# Patient Record
Sex: MALE | Race: WHITE | NOT HISPANIC OR LATINO | Employment: OTHER | URBAN - METROPOLITAN AREA
[De-identification: names, ages, dates, MRNs, and addresses within clinical notes are randomized per-mention and may not be internally consistent; named-entity substitution may affect disease eponyms.]

---

## 2018-04-10 RX ORDER — FLUTICASONE PROPIONATE 50 MCG
2 SPRAY, SUSPENSION (ML) NASAL DAILY
COMMUNITY
End: 2022-07-21

## 2018-04-10 RX ORDER — SILDENAFIL CITRATE 20 MG/1
20 TABLET ORAL 3 TIMES DAILY
COMMUNITY
End: 2022-07-21 | Stop reason: SDUPTHER

## 2018-04-10 RX ORDER — ALLOPURINOL 100 MG/1
100 TABLET ORAL DAILY
COMMUNITY
End: 2022-07-01 | Stop reason: SDUPTHER

## 2018-04-10 RX ORDER — BUDESONIDE AND FORMOTEROL FUMARATE DIHYDRATE 80; 4.5 UG/1; UG/1
2 AEROSOL RESPIRATORY (INHALATION)
COMMUNITY
End: 2022-07-21 | Stop reason: SDUPTHER

## 2018-04-10 RX ORDER — PRAVASTATIN SODIUM 40 MG
40 TABLET ORAL DAILY
COMMUNITY
End: 2022-07-21 | Stop reason: SDUPTHER

## 2018-04-10 RX ORDER — METHOCARBAMOL 750 MG/1
750 TABLET, FILM COATED ORAL 4 TIMES DAILY PRN
COMMUNITY
End: 2018-04-11

## 2018-04-10 RX ORDER — LISINOPRIL 20 MG/1
20 TABLET ORAL DAILY
COMMUNITY
End: 2022-07-01 | Stop reason: SDUPTHER

## 2018-04-11 ENCOUNTER — OFFICE VISIT (OUTPATIENT)
Dept: NEUROSURGERY | Facility: CLINIC | Age: 68
End: 2018-04-11

## 2018-04-11 VITALS — HEIGHT: 71 IN | BODY MASS INDEX: 32.76 KG/M2 | WEIGHT: 234 LBS | TEMPERATURE: 96 F

## 2018-04-11 DIAGNOSIS — M54.9 MECHANICAL BACK PAIN: Primary | ICD-10-CM

## 2018-04-11 DIAGNOSIS — M50.30 DEGENERATIVE DISC DISEASE, CERVICAL: ICD-10-CM

## 2018-04-11 DIAGNOSIS — M47.812 CERVICAL ARTHRITIS: ICD-10-CM

## 2018-04-11 DIAGNOSIS — M50.30 BULGING OF CERVICAL INTERVERTEBRAL DISC: ICD-10-CM

## 2018-04-11 PROCEDURE — 99203 OFFICE O/P NEW LOW 30 MIN: CPT | Performed by: NEUROLOGICAL SURGERY

## 2018-04-11 NOTE — PROGRESS NOTES
Patient: Balbir Castro  : 1950    Primary Care Provider: Leif Beard MD    Requesting Provider: As above        History    Chief Complaint: Left-sided neck pain.    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  He's had what he describes as a left ear ache for many years.  More recently he's noted that it seems to be more present in the side of his neck on the left.  He describes a popping and cracking at times.  He has no radicular symptoms.  At times he gets some numbness in the ulnar fingers of each hand.  He's used an arthritis screen.  His symptoms are more aggravating than debilitating.  Activity seems to worsen his symptoms and he is very active currently.  He is remodeling a home.    Review of Systems   Constitutional: Negative for activity change, appetite change, chills, diaphoresis, fatigue, fever and unexpected weight change.   HENT: Positive for congestion and ear pain. Negative for dental problem, drooling, ear discharge, facial swelling, hearing loss, mouth sores, nosebleeds, postnasal drip, rhinorrhea, sinus pressure, sneezing, sore throat, tinnitus, trouble swallowing and voice change.    Eyes: Negative for photophobia, pain, discharge, redness, itching and visual disturbance.   Respiratory: Negative for apnea, cough, choking, chest tightness, shortness of breath, wheezing and stridor.    Cardiovascular: Negative for chest pain, palpitations and leg swelling.   Gastrointestinal: Negative for abdominal distention, abdominal pain, anal bleeding, blood in stool, constipation, diarrhea, nausea, rectal pain and vomiting.   Endocrine: Negative for cold intolerance, heat intolerance, polydipsia, polyphagia and polyuria.   Genitourinary: Negative for decreased urine volume, difficulty urinating, dysuria, enuresis, flank pain, frequency, genital sores, hematuria and urgency.   Musculoskeletal: Positive for neck pain and  "neck stiffness. Negative for arthralgias, back pain, gait problem, joint swelling and myalgias.   Skin: Negative for color change, pallor, rash and wound.   Allergic/Immunologic: Negative for environmental allergies, food allergies and immunocompromised state.   Neurological: Positive for numbness (Around Groin area, bilateral hands, Bilateral pinky fingers) and headaches. Negative for dizziness, tremors, seizures, syncope, facial asymmetry, speech difficulty, weakness and light-headedness.   Hematological: Negative for adenopathy. Does not bruise/bleed easily.   Psychiatric/Behavioral: Negative for agitation, behavioral problems, confusion, decreased concentration, dysphoric mood, hallucinations, self-injury, sleep disturbance and suicidal ideas. The patient is not nervous/anxious and is not hyperactive.    All other systems reviewed and are negative.      The patient's past medical history, past surgical history, family history, and social history have been reviewed at length in the electronic medical record.    Physical Exam:   Temp 96 °F (35.6 °C) (Temporal Artery )   Ht 180.3 cm (71\")   Wt 106 kg (234 lb)   BMI 32.64 kg/m²   CONSTITUTIONAL: Patient is well-nourished, pleasant and appears stated age.  CV: Heart regular rate and rhythm without murmur, rub, or gallop.  PULMONARY: Lungs are clear to ascultation.  MUSCULOSKELETAL:  Neck tenderness to palpation is not observed.   ROM in neck is normal.  NEUROLOGICAL:  Orientation, memory, attention span, language function, and cognition have been examined and are intact.  Strength is intact in the upper and lower extremities to direct testing.  Muscle tone is normal throughout.  Station and gait are normal.  Sensation is intact to light touch testing throughout.  Deep tendon reflexes are 1+ and symmetrical.  Jasmin's Sign is negative bilaterally. No clonus is elicited.  Coordination is intact.      Medical Decision Making    Data Review:   MRI of the cervical " spine dated 3/21/18 demonstrates some diffuse degenerative disc disease and mild spondylosis.  There is no cord or high-grade root compromise.    Diagnosis:   The patient has some mechanical neck pain that could well be facet mediated.  I do not see evidence of a radiculopathy or certainly a myelopathy.    Treatment Options:   Treatment should largely be symptomatic.  I am going to refer him to physical therapy for a few sessions to see if that will be helpful.  He does not feel as if his symptoms are severe enough to warrant facet blocks and potentially rhizotomies.  He will follow-up on an as-needed basis.       Diagnosis Plan   1. Mechanical back pain  Ambulatory Referral to Physical Therapy Evaluate and treat; ROM, Stretching   2. Bulging of cervical intervertebral disc     3. Degenerative disc disease, cervical     4. Cervical arthritis         Scribed for Luis Perea MD by Augustina Chu CMA on 04/11/2018 at 9:27 AM    I, Dr. Perea, personally performed the services described in the documentation, as scribed in my presence, and it is both accurate and complete.

## 2022-05-31 RX ORDER — PROPRANOLOL HYDROCHLORIDE 20 MG/1
TABLET ORAL
Qty: 90 TABLET | Refills: 0 | Status: SHIPPED | OUTPATIENT
Start: 2022-05-31 | End: 2022-07-21 | Stop reason: SDUPTHER

## 2022-06-28 RX ORDER — ALLOPURINOL 100 MG/1
TABLET ORAL
Qty: 90 TABLET | OUTPATIENT
Start: 2022-06-28

## 2022-06-28 RX ORDER — LISINOPRIL 20 MG/1
TABLET ORAL
Qty: 90 TABLET | OUTPATIENT
Start: 2022-06-28

## 2022-07-01 RX ORDER — LISINOPRIL 20 MG/1
20 TABLET ORAL DAILY
Qty: 30 TABLET | Refills: 0 | Status: SHIPPED | OUTPATIENT
Start: 2022-07-01 | End: 2022-07-21 | Stop reason: SDUPTHER

## 2022-07-01 RX ORDER — ALLOPURINOL 100 MG/1
100 TABLET ORAL DAILY
Qty: 30 TABLET | Refills: 0 | Status: SHIPPED | OUTPATIENT
Start: 2022-07-01 | End: 2022-07-21 | Stop reason: SDUPTHER

## 2022-07-21 ENCOUNTER — OFFICE VISIT (OUTPATIENT)
Dept: FAMILY MEDICINE CLINIC | Facility: CLINIC | Age: 72
End: 2022-07-21

## 2022-07-21 VITALS
SYSTOLIC BLOOD PRESSURE: 132 MMHG | DIASTOLIC BLOOD PRESSURE: 84 MMHG | HEART RATE: 85 BPM | BODY MASS INDEX: 31.48 KG/M2 | OXYGEN SATURATION: 98 % | HEIGHT: 71 IN | WEIGHT: 224.9 LBS

## 2022-07-21 DIAGNOSIS — Z12.5 PROSTATE CANCER SCREENING: ICD-10-CM

## 2022-07-21 DIAGNOSIS — J45.20 MILD INTERMITTENT ASTHMA WITHOUT COMPLICATION: ICD-10-CM

## 2022-07-21 DIAGNOSIS — E78.2 MIXED HYPERLIPIDEMIA: ICD-10-CM

## 2022-07-21 DIAGNOSIS — Z00.00 ROUTINE GENERAL MEDICAL EXAMINATION AT A HEALTH CARE FACILITY: Primary | ICD-10-CM

## 2022-07-21 DIAGNOSIS — I10 PRIMARY HYPERTENSION: ICD-10-CM

## 2022-07-21 PROCEDURE — 36415 COLL VENOUS BLD VENIPUNCTURE: CPT | Performed by: FAMILY MEDICINE

## 2022-07-21 PROCEDURE — 99397 PER PM REEVAL EST PAT 65+ YR: CPT | Performed by: FAMILY MEDICINE

## 2022-07-21 RX ORDER — LISINOPRIL 20 MG/1
20 TABLET ORAL DAILY
Qty: 30 TABLET | Refills: 0 | Status: SHIPPED | OUTPATIENT
Start: 2022-07-21 | End: 2022-07-25

## 2022-07-21 RX ORDER — SILDENAFIL CITRATE 20 MG/1
TABLET ORAL
Qty: 30 TABLET | Refills: 11 | Status: SHIPPED | OUTPATIENT
Start: 2022-07-21

## 2022-07-21 RX ORDER — PRAVASTATIN SODIUM 40 MG
40 TABLET ORAL DAILY
Qty: 90 TABLET | Refills: 1 | Status: SHIPPED | OUTPATIENT
Start: 2022-07-21 | End: 2023-02-17

## 2022-07-21 RX ORDER — ALLOPURINOL 100 MG/1
100 TABLET ORAL DAILY
Qty: 90 TABLET | Refills: 1 | Status: SHIPPED | OUTPATIENT
Start: 2022-07-21 | End: 2023-01-23

## 2022-07-21 RX ORDER — BUDESONIDE AND FORMOTEROL FUMARATE DIHYDRATE 80; 4.5 UG/1; UG/1
2 AEROSOL RESPIRATORY (INHALATION)
Qty: 10.2 G | Refills: 5 | Status: SHIPPED | OUTPATIENT
Start: 2022-07-21

## 2022-07-21 RX ORDER — PROPRANOLOL HYDROCHLORIDE 20 MG/1
20 TABLET ORAL DAILY
Qty: 90 TABLET | Refills: 1 | Status: SHIPPED | OUTPATIENT
Start: 2022-07-21 | End: 2023-03-06

## 2022-07-21 NOTE — PROGRESS NOTES
"Chief Complaint  Hypertension, Hyperlipidemia, and Annual Exam    Subjective          Balbir Castro presents to [facility] for preventative yearly exam.   Patient comes in today to recheck on his medications also he wants his blood work done says he been doing pretty good lately no other real problems or difficulties he states that he has having some stressors because his elderly parents and and mother-in-law has now been placed in assisted living.      Objective   Vital Signs:   /84   Pulse 85   Ht 180.3 cm (71\")   Wt 102 kg (224 lb 14.4 oz)   SpO2 98%   BMI 31.37 kg/m²     Body mass index is 31.37 kg/m².    Predictive Model Details   No score data available for Risk of Fall        PHQ-9 Depression Screening  Little interest or pleasure in doing things? 0-->not at all   Feeling down, depressed, or hopeless? 0-->not at all   Trouble falling or staying asleep, or sleeping too much?     Feeling tired or having little energy?     Poor appetite or overeating?     Feeling bad about yourself - or that you are a failure or have let yourself or your family down?     Trouble concentrating on things, such as reading the newspaper or watching television?     Moving or speaking so slowly that other people could have noticed? Or the opposite - being so fidgety or restless that you have been moving around a lot more than usual?     Thoughts that you would be better off dead, or of hurting yourself in some way?     PHQ-9 Total Score 0   If you checked off any problems, how difficult have these problems made it for you to do your work, take care of things at home, or get along with other people?       Immunization History   Administered Date(s) Administered   • COVID-19 (MODERNA) 1st, 2nd, 3rd Dose Only 03/02/2021, 03/30/2021   • COVID-19 (MODERNA) BOOSTER 10/26/2021   • Fluzone Split Quad (Multi-dose) 11/08/2019, 10/15/2021   • Influenza, Unspecified 10/15/2021   • Pneumococcal Conjugate " 13-Valent (PCV13) 07/25/2017   • Pneumococcal Polysaccharide (PPSV23) 08/02/2018       Review of Systems   Constitutional: Negative.    HENT: Negative for congestion, dental problem, ear discharge, ear pain and sore throat.    Respiratory: Negative for apnea, chest tightness and shortness of breath.    Gastrointestinal: Negative for constipation and nausea.   Endocrine: Negative for polyuria.   Genitourinary: Negative for difficulty urinating.   Musculoskeletal: Negative for arthralgias and gait problem.   Skin: Negative for rash.   Hematological: Negative for adenopathy.       Past History:  Medical History: has a past medical history of Actinic keratosis, Arthritis, Asthma, Benign prostatic hyperplasia, Bronchitis, Cervicalgia, Chronic gouty arthritis, ED (erectile dysfunction), Elevated blood pressure reading, Essential hypertension, Ganglion cyst, Gout, Headache, Hearing problem, High risk medication use, Hyperlipidemia, Hypertension, Idiopathic chronic gout of multiple sites without tophus, Inflamed seborrheic keratosis, Mild intermittent asthma, Mixed hyperlipidemia, Neck pain, Obesity, Osteoarthritis of knee, Osteoarthritis of knees, bilateral, Rheumatoid arthritis (HCC), Seasonal allergies, Spinal stenosis in cervical region, Tonsillitis, and Wheezing.   Surgical History: has a past surgical history that includes Cyst Removal (Left, 2008); Tympanostomy tube placement (Left, 2008); and Ganglion cyst excision.   Family History: family history includes Arthritis in his mother; Cancer in his maternal uncle; Hypertension in his father and mother.   Social History: reports that he quit smoking about 36 years ago. His smoking use included cigarettes. He has never used smokeless tobacco. He reports current alcohol use. He reports that he does not use drugs.      Current Outpatient Medications:   •  allopurinol (ZYLOPRIM) 100 MG tablet, Take 1 tablet by mouth Daily., Disp: 90 tablet, Rfl: 1  •  budesonide-formoterol  (SYMBICORT) 80-4.5 MCG/ACT inhaler, Inhale 2 puffs 2 (Two) Times a Day., Disp: 10.2 g, Rfl: 5  •  DICLOFENAC PO, Take  by mouth., Disp: , Rfl:   •  lisinopril (PRINIVIL,ZESTRIL) 20 MG tablet, Take 1 tablet by mouth Daily., Disp: 30 tablet, Rfl: 0  •  pravastatin (PRAVACHOL) 40 MG tablet, Take 1 tablet by mouth Daily., Disp: 90 tablet, Rfl: 1  •  propranolol (INDERAL) 20 MG tablet, Take 1 tablet by mouth Daily., Disp: 90 tablet, Rfl: 1  •  sildenafil (REVATIO) 20 MG tablet, 2 to 5 tablets p.o. 30 minutes before sexual activity, Disp: 30 tablet, Rfl: 11    Allergies: Penicillin g, Amoxicillin, and Penicillins    Physical Exam  Vitals reviewed.   Constitutional:       Appearance: Normal appearance.   HENT:      Head: Normocephalic.      Right Ear: Tympanic membrane, ear canal and external ear normal.      Left Ear: Tympanic membrane, ear canal and external ear normal.      Nose: Nose normal.      Mouth/Throat:      Pharynx: Oropharynx is clear.   Eyes:      Pupils: Pupils are equal, round, and reactive to light.   Cardiovascular:      Rate and Rhythm: Normal rate and regular rhythm.      Pulses: Normal pulses.   Pulmonary:      Effort: Pulmonary effort is normal.      Breath sounds: Normal breath sounds.   Abdominal:      General: Abdomen is flat. Bowel sounds are normal.      Palpations: Abdomen is soft.   Musculoskeletal:         General: Normal range of motion.   Skin:     General: Skin is warm and dry.   Neurological:      General: No focal deficit present.      Mental Status: He is alert and oriented to person, place, and time.          Result Review :                   Assessment and Plan    Diagnoses and all orders for this visit:    1. Routine general medical examination at a health care facility (Primary)  Comments:  Blood work and continue medications  Orders:  -     Comprehensive Metabolic Panel; Future  -     Comprehensive Metabolic Panel    2. Primary hypertension  Comments:  Blood work and refill  medications  Orders:  -     CBC & Differential; Future  -     CBC & Differential    3. Mixed hyperlipidemia  Comments:  Refill meds  Orders:  -     Lipid Panel; Future  -     Lipid Panel    4. Prostate cancer screening  Comments:  Get PSA  Orders:  -     PSA Screen; Future  -     PSA Screen    5. Mild intermittent asthma without complication  Comments:  Stable refilled meds    Other orders  -     allopurinol (ZYLOPRIM) 100 MG tablet; Take 1 tablet by mouth Daily.  Dispense: 90 tablet; Refill: 1  -     budesonide-formoterol (SYMBICORT) 80-4.5 MCG/ACT inhaler; Inhale 2 puffs 2 (Two) Times a Day.  Dispense: 10.2 g; Refill: 5  -     lisinopril (PRINIVIL,ZESTRIL) 20 MG tablet; Take 1 tablet by mouth Daily.  Dispense: 30 tablet; Refill: 0  -     pravastatin (PRAVACHOL) 40 MG tablet; Take 1 tablet by mouth Daily.  Dispense: 90 tablet; Refill: 1  -     propranolol (INDERAL) 20 MG tablet; Take 1 tablet by mouth Daily.  Dispense: 90 tablet; Refill: 1  -     sildenafil (REVATIO) 20 MG tablet; 2 to 5 tablets p.o. 30 minutes before sexual activity  Dispense: 30 tablet; Refill: 11            Updated annual wellness visit checklist.  Immunizations discussed.  Screening up-to-date.  Recommend yearly dental and eye exams. Also discussed monitoring of blood pressure and lipids.    Follow Up   No follow-ups on file.  Patient was given instructions and counseling regarding his condition or for health maintenance advice. Please see specific information pulled into the AVS if appropriate.     Leif Beard MD  Answers for HPI/ROS submitted by the patient on 7/19/2022  What is the primary reason for your visit?: Other  Please describe your symptoms.: To renew prescriptions. Had to provide answers to the questions below in order to check-in online.  Have you had these symptoms before?: No  How long have you been having these symptoms?: 1-4 days

## 2022-07-22 LAB
ALBUMIN SERPL-MCNC: 4.5 G/DL (ref 3.7–4.7)
ALBUMIN/GLOB SERPL: 1.7 {RATIO} (ref 1.2–2.2)
ALP SERPL-CCNC: 47 IU/L (ref 44–121)
ALT SERPL-CCNC: 27 IU/L (ref 0–44)
AST SERPL-CCNC: 25 IU/L (ref 0–40)
BASOPHILS # BLD AUTO: 0.1 X10E3/UL (ref 0–0.2)
BASOPHILS NFR BLD AUTO: 2 %
BILIRUB SERPL-MCNC: 0.5 MG/DL (ref 0–1.2)
BUN SERPL-MCNC: 15 MG/DL (ref 8–27)
BUN/CREAT SERPL: 15 (ref 10–24)
CALCIUM SERPL-MCNC: 9.4 MG/DL (ref 8.6–10.2)
CHLORIDE SERPL-SCNC: 100 MMOL/L (ref 96–106)
CHOLEST SERPL-MCNC: 179 MG/DL (ref 100–199)
CO2 SERPL-SCNC: 24 MMOL/L (ref 20–29)
CREAT SERPL-MCNC: 1 MG/DL (ref 0.76–1.27)
EGFRCR SERPLBLD CKD-EPI 2021: 80 ML/MIN/1.73
EOSINOPHIL # BLD AUTO: 0.3 X10E3/UL (ref 0–0.4)
EOSINOPHIL NFR BLD AUTO: 7 %
ERYTHROCYTE [DISTWIDTH] IN BLOOD BY AUTOMATED COUNT: 11.9 % (ref 11.6–15.4)
GLOBULIN SER CALC-MCNC: 2.6 G/DL (ref 1.5–4.5)
GLUCOSE SERPL-MCNC: 96 MG/DL (ref 65–99)
HCT VFR BLD AUTO: 39.3 % (ref 37.5–51)
HDLC SERPL-MCNC: 63 MG/DL
HGB BLD-MCNC: 13 G/DL (ref 13–17.7)
IMM GRANULOCYTES # BLD AUTO: 0 X10E3/UL (ref 0–0.1)
IMM GRANULOCYTES NFR BLD AUTO: 1 %
LDLC SERPL CALC-MCNC: 102 MG/DL (ref 0–99)
LYMPHOCYTES # BLD AUTO: 1.4 X10E3/UL (ref 0.7–3.1)
LYMPHOCYTES NFR BLD AUTO: 31 %
MCH RBC QN AUTO: 32.2 PG (ref 26.6–33)
MCHC RBC AUTO-ENTMCNC: 33.1 G/DL (ref 31.5–35.7)
MCV RBC AUTO: 97 FL (ref 79–97)
MONOCYTES # BLD AUTO: 0.5 X10E3/UL (ref 0.1–0.9)
MONOCYTES NFR BLD AUTO: 11 %
NEUTROPHILS # BLD AUTO: 2.2 X10E3/UL (ref 1.4–7)
NEUTROPHILS NFR BLD AUTO: 48 %
PLATELET # BLD AUTO: 201 X10E3/UL (ref 150–450)
POTASSIUM SERPL-SCNC: 5.3 MMOL/L (ref 3.5–5.2)
PROT SERPL-MCNC: 7.1 G/DL (ref 6–8.5)
PSA SERPL-MCNC: 3 NG/ML (ref 0–4)
RBC # BLD AUTO: 4.04 X10E6/UL (ref 4.14–5.8)
SODIUM SERPL-SCNC: 138 MMOL/L (ref 134–144)
TRIGL SERPL-MCNC: 75 MG/DL (ref 0–149)
VLDLC SERPL CALC-MCNC: 14 MG/DL (ref 5–40)
WBC # BLD AUTO: 4.5 X10E3/UL (ref 3.4–10.8)

## 2022-07-25 RX ORDER — LISINOPRIL 20 MG/1
20 TABLET ORAL DAILY
Qty: 90 TABLET | Refills: 1 | Status: SHIPPED | OUTPATIENT
Start: 2022-07-25 | End: 2023-01-23

## 2022-08-20 ENCOUNTER — OFFICE VISIT (OUTPATIENT)
Dept: FAMILY MEDICINE CLINIC | Facility: CLINIC | Age: 72
End: 2022-08-20

## 2022-08-20 VITALS
DIASTOLIC BLOOD PRESSURE: 84 MMHG | OXYGEN SATURATION: 98 % | BODY MASS INDEX: 31.78 KG/M2 | HEIGHT: 71 IN | HEART RATE: 73 BPM | SYSTOLIC BLOOD PRESSURE: 118 MMHG | WEIGHT: 227 LBS

## 2022-08-20 DIAGNOSIS — K12.0 APHTHOUS ULCER: Primary | ICD-10-CM

## 2022-08-20 PROBLEM — N52.9 ERECTILE DYSFUNCTION: Status: ACTIVE | Noted: 2022-08-20

## 2022-08-20 PROBLEM — M48.02 CERVICAL STENOSIS OF SPINAL CANAL: Status: ACTIVE | Noted: 2022-08-20

## 2022-08-20 PROBLEM — Z79.899 HIGH RISK MEDICATION USE: Status: ACTIVE | Noted: 2022-08-20

## 2022-08-20 PROBLEM — M1A.00X0 CHRONIC GOUTY ARTHRITIS: Status: ACTIVE | Noted: 2022-08-20

## 2022-08-20 PROBLEM — R06.2 WHEEZING: Status: ACTIVE | Noted: 2022-08-20

## 2022-08-20 PROBLEM — M54.2 NECK PAIN: Status: ACTIVE | Noted: 2022-08-20

## 2022-08-20 PROBLEM — E78.2 MIXED HYPERLIPIDEMIA: Status: ACTIVE | Noted: 2022-08-20

## 2022-08-20 PROBLEM — H91.90 HEARING PROBLEM: Status: ACTIVE | Noted: 2022-08-20

## 2022-08-20 PROBLEM — I10 ESSENTIAL HYPERTENSION: Status: ACTIVE | Noted: 2022-08-20

## 2022-08-20 PROBLEM — M17.9 OSTEOARTHRITIS OF KNEE: Status: ACTIVE | Noted: 2022-08-20

## 2022-08-20 PROBLEM — J45.20 MILD INTERMITTENT ASTHMA: Status: ACTIVE | Noted: 2022-08-20

## 2022-08-20 PROBLEM — L82.0 INFLAMED SEBORRHEIC KERATOSIS: Status: ACTIVE | Noted: 2022-08-20

## 2022-08-20 PROCEDURE — 99213 OFFICE O/P EST LOW 20 MIN: CPT | Performed by: PHYSICIAN ASSISTANT

## 2022-08-20 NOTE — ASSESSMENT & PLAN NOTE
Reassurance, I see no concerning areas other than the tiny ulcer, recommended use of OTC numbing medications and refrain from tomato based food and spicy foods.  Call or return if sxs persist.

## 2022-08-20 NOTE — PROGRESS NOTES
"Chief Complaint  Sore on tongue (X2 wks)    Subjective          Balbir Castro presents to [facility]  History of Present Illness says he has a little sore on his tongue and its been there couple weeks.  He gets these about once a year when he has a lot of tomato based products and bloody Layla's.  This time of year because they are fresh tomatoes he needs a lot of them he also eats a lot of spicy foods which she says aggravates it.  Normally these things go away after a couple of weeks but this 1 has lingered on little bit longer and he just wanted to make sure there was not anything serious going on with his tongue.    Objective   Vital Signs:   /84 (BP Location: Right arm)   Pulse 73   Ht 180.3 cm (71\")   Wt 103 kg (227 lb)   SpO2 98%   BMI 31.66 kg/m²     Body mass index is 31.66 kg/m².    Review of Systems   Constitutional: Negative for chills, fatigue and fever.   HENT: Positive for mouth sores (sore on the left side of his tongue). Negative for congestion, ear pain, sinus pressure, sore throat, swollen glands and trouble swallowing.        Past History:  Medical History: has a past medical history of Actinic keratosis, Arthritis, Asthma, Benign prostatic hyperplasia, Bronchitis, Cervicalgia, Chronic gouty arthritis, ED (erectile dysfunction), Elevated blood pressure reading, Essential hypertension, Ganglion cyst, Gout, Headache, Hearing problem, High risk medication use, Hyperlipidemia, Hypertension, Idiopathic chronic gout of multiple sites without tophus, Inflamed seborrheic keratosis, Mild intermittent asthma, Mixed hyperlipidemia, Neck pain, Obesity, Osteoarthritis of knee, Osteoarthritis of knees, bilateral, Rheumatoid arthritis (HCC), Seasonal allergies, Spinal stenosis in cervical region, Tonsillitis, and Wheezing.   Surgical History: has a past surgical history that includes Cyst Removal (Left, 2008); Tympanostomy tube placement (Left, 2008); and Ganglion cyst " excision.   Family History: family history includes Arthritis in his mother; Cancer in his maternal uncle; Hypertension in his father and mother.   Social History: reports that he quit smoking about 36 years ago. His smoking use included cigarettes. He has never used smokeless tobacco. He reports current alcohol use. He reports that he does not use drugs.      Current Outpatient Medications:   •  allopurinol (ZYLOPRIM) 100 MG tablet, Take 1 tablet by mouth Daily., Disp: 90 tablet, Rfl: 1  •  budesonide-formoterol (SYMBICORT) 80-4.5 MCG/ACT inhaler, Inhale 2 puffs 2 (Two) Times a Day., Disp: 10.2 g, Rfl: 5  •  DICLOFENAC PO, Take  by mouth., Disp: , Rfl:   •  lisinopril (PRINIVIL,ZESTRIL) 20 MG tablet, TAKE 1 TABLET BY MOUTH DAILY, Disp: 90 tablet, Rfl: 1  •  pravastatin (PRAVACHOL) 40 MG tablet, Take 1 tablet by mouth Daily., Disp: 90 tablet, Rfl: 1  •  propranolol (INDERAL) 20 MG tablet, Take 1 tablet by mouth Daily., Disp: 90 tablet, Rfl: 1  •  sildenafil (REVATIO) 20 MG tablet, 2 to 5 tablets p.o. 30 minutes before sexual activity, Disp: 30 tablet, Rfl: 11    Allergies: Amoxicillin, Penicillin g, and Penicillins    Physical Exam  Constitutional:       Appearance: Normal appearance.   HENT:      Head: Normocephalic and atraumatic.      Right Ear: Tympanic membrane, ear canal and external ear normal.      Left Ear: Tympanic membrane, ear canal and external ear normal.      Nose: Nose normal.      Mouth/Throat:      Mouth: Mucous membranes are moist.      Comments: He has a tiny blister on the left side of his tongue, everything else appears fine to me.   Neurological:      Mental Status: He is alert and oriented to person, place, and time.   Psychiatric:         Mood and Affect: Mood normal.         Behavior: Behavior normal.          Result Review :                   Assessment and Plan    Diagnoses and all orders for this visit:    1. Aphthous ulcer (Primary)  Assessment & Plan:  Reassurance, I see no concerning  areas other than the tiny ulcer, recommended use of OTC numbing medications and refrain from tomato based food and spicy foods.  Call or return if sxs persist.         Follow Up   No follow-ups on file.  Patient was given instructions and counseling regarding his condition or for health maintenance advice. Please see specific information pulled into the AVS if appropriate.     Rajni Cordova PA-C

## 2022-11-02 ENCOUNTER — OFFICE VISIT (OUTPATIENT)
Dept: FAMILY MEDICINE CLINIC | Facility: CLINIC | Age: 72
End: 2022-11-02

## 2022-11-02 VITALS
SYSTOLIC BLOOD PRESSURE: 118 MMHG | OXYGEN SATURATION: 98 % | BODY MASS INDEX: 31.5 KG/M2 | HEIGHT: 71 IN | DIASTOLIC BLOOD PRESSURE: 62 MMHG | WEIGHT: 225 LBS | HEART RATE: 62 BPM

## 2022-11-02 DIAGNOSIS — J45.20 MILD INTERMITTENT ASTHMA WITHOUT COMPLICATION: Primary | ICD-10-CM

## 2022-11-02 DIAGNOSIS — M1A.00X0 CHRONIC GOUTY ARTHRITIS: ICD-10-CM

## 2022-11-02 PROCEDURE — 99213 OFFICE O/P EST LOW 20 MIN: CPT | Performed by: FAMILY MEDICINE

## 2022-11-02 RX ORDER — BUDESONIDE AND FORMOTEROL FUMARATE DIHYDRATE 80; 4.5 UG/1; UG/1
AEROSOL RESPIRATORY (INHALATION)
COMMUNITY
End: 2022-11-02

## 2022-11-02 RX ORDER — ALBUTEROL SULFATE 90 UG/1
2 AEROSOL, METERED RESPIRATORY (INHALATION) EVERY 4 HOURS PRN
Qty: 18 G | Refills: 5 | Status: SHIPPED | OUTPATIENT
Start: 2022-11-02

## 2022-11-02 RX ORDER — COLCHICINE 0.6 MG/1
0.6 TABLET ORAL DAILY
Qty: 24 TABLET | Refills: 3 | Status: SHIPPED | OUTPATIENT
Start: 2022-11-02

## 2022-11-02 NOTE — PROGRESS NOTES
"Chief Complaint  Gout (Would like to make sure medications are filled for vacation. )    Subjective          Balbir Castro presents to [facility]  History of Present Illness  Patient states he needs a refill of his inhaler he also wants his as needed gout medication refilled he says he [place] travel sees any disease medications foregoes he says otherwise has been doing pretty good he does occasionally when he eats abnormally get a flareup of gout and was preprepared      Objective   Vital Signs:   /62   Pulse 62   Ht 180.3 cm (71\")   Wt 102 kg (225 lb)   SpO2 98%   BMI 31.38 kg/m²     Body mass index is 31.38 kg/m².    Review of Systems   Constitutional: Negative.    HENT: Negative for congestion, dental problem, ear discharge, ear pain and sore throat.    Respiratory: Negative for apnea, chest tightness and shortness of breath.    Gastrointestinal: Negative for constipation and nausea.   Endocrine: Negative for polyuria.   Genitourinary: Negative for difficulty urinating.   Musculoskeletal: Positive for arthralgias. Negative for gait problem.   Skin: Negative for rash.   Hematological: Negative for adenopathy.       Past History:  Medical History: has a past medical history of Actinic keratosis, Arthritis, Asthma, Benign prostatic hyperplasia, Bronchitis, Cervicalgia, Chronic gouty arthritis, ED (erectile dysfunction), Elevated blood pressure reading, Essential hypertension, Ganglion cyst, Gout, Headache, Hearing problem, High risk medication use, Hyperlipidemia, Hypertension, Idiopathic chronic gout of multiple sites without tophus, Inflamed seborrheic keratosis, Mild intermittent asthma, Mixed hyperlipidemia, Neck pain, Obesity, Osteoarthritis of knee, Osteoarthritis of knees, bilateral, Rheumatoid arthritis (HCC), Seasonal allergies, Spinal stenosis in cervical region, Tonsillitis, and Wheezing.   Surgical History: has a past surgical history that includes Cyst Removal " (Left, 2008); Tympanostomy tube placement (Left, 2008); and Ganglion cyst excision.         Current Outpatient Medications:   •  allopurinol (ZYLOPRIM) 100 MG tablet, Take 1 tablet by mouth Daily., Disp: 90 tablet, Rfl: 1  •  budesonide-formoterol (SYMBICORT) 80-4.5 MCG/ACT inhaler, Inhale 2 puffs 2 (Two) Times a Day., Disp: 10.2 g, Rfl: 5  •  lisinopril (PRINIVIL,ZESTRIL) 20 MG tablet, TAKE 1 TABLET BY MOUTH DAILY, Disp: 90 tablet, Rfl: 1  •  pravastatin (PRAVACHOL) 40 MG tablet, Take 1 tablet by mouth Daily., Disp: 90 tablet, Rfl: 1  •  propranolol (INDERAL) 20 MG tablet, Take 1 tablet by mouth Daily., Disp: 90 tablet, Rfl: 1  •  sildenafil (REVATIO) 20 MG tablet, 2 to 5 tablets p.o. 30 minutes before sexual activity, Disp: 30 tablet, Rfl: 11  •  albuterol sulfate  (90 Base) MCG/ACT inhaler, Inhale 2 puffs Every 4 (Four) Hours As Needed for Wheezing., Disp: 18 g, Rfl: 5  •  colchicine 0.6 MG tablet, Take 1 tablet by mouth Daily., Disp: 24 tablet, Rfl: 3    Allergies: Amoxicillin, Penicillin g, and Penicillins    Physical Exam  Constitutional:       Appearance: Normal appearance.   HENT:      Head: Normocephalic.      Right Ear: Tympanic membrane, ear canal and external ear normal.      Left Ear: Tympanic membrane, ear canal and external ear normal.      Nose: Nose normal.      Mouth/Throat:      Pharynx: Oropharynx is clear.   Eyes:      Pupils: Pupils are equal, round, and reactive to light.   Cardiovascular:      Rate and Rhythm: Normal rate and regular rhythm.      Pulses: Normal pulses.   Pulmonary:      Effort: Pulmonary effort is normal.      Breath sounds: Normal breath sounds.   Abdominal:      General: Abdomen is flat. Bowel sounds are normal.      Palpations: Abdomen is soft.   Musculoskeletal:         General: Normal range of motion.   Skin:     General: Skin is warm and dry.   Neurological:      General: No focal deficit present.      Mental Status: He is alert and oriented to person, place, and  time.          Result Review :                   Assessment and Plan    Diagnoses and all orders for this visit:    1. Mild intermittent asthma without complication (Primary)  Comments:  Continue meds and refilled albuterol inhaler  Orders:  -     albuterol sulfate  (90 Base) MCG/ACT inhaler; Inhale 2 puffs Every 4 (Four) Hours As Needed for Wheezing.  Dispense: 18 g; Refill: 5    2. Chronic gouty arthritis  Comments:  Continue allopurinol and add some as needed colchicine discussed risk-benefit of it  Orders:  -     colchicine 0.6 MG tablet; Take 1 tablet by mouth Daily.  Dispense: 24 tablet; Refill: 3              Follow Up   No follow-ups on file.  Patient was given instructions and counseling regarding his condition or for health maintenance advice. Please see specific information pulled into the AVS if appropriate.     Leif Beard MD  Answers for HPI/ROS submitted by the patient on 11/1/2022  What is the primary reason for your visit?: Other  Please describe your symptoms.: Need to make sure I have meds needed for travel out of the country.  Have you had these symptoms before?: No  How long have you been having these symptoms?: 1-4 days

## 2023-01-23 RX ORDER — LISINOPRIL 20 MG/1
20 TABLET ORAL DAILY
Qty: 90 TABLET | Refills: 1 | Status: SHIPPED | OUTPATIENT
Start: 2023-01-23

## 2023-01-23 RX ORDER — ALLOPURINOL 100 MG/1
100 TABLET ORAL DAILY
Qty: 90 TABLET | Refills: 1 | Status: SHIPPED | OUTPATIENT
Start: 2023-01-23

## 2023-02-17 RX ORDER — PRAVASTATIN SODIUM 40 MG
40 TABLET ORAL DAILY
Qty: 90 TABLET | Refills: 1 | Status: SHIPPED | OUTPATIENT
Start: 2023-02-17

## 2023-03-06 RX ORDER — PROPRANOLOL HYDROCHLORIDE 20 MG/1
20 TABLET ORAL DAILY
Qty: 90 TABLET | Refills: 1 | Status: SHIPPED | OUTPATIENT
Start: 2023-03-06

## 2023-05-18 ENCOUNTER — OFFICE VISIT (OUTPATIENT)
Dept: FAMILY MEDICINE CLINIC | Facility: CLINIC | Age: 73
End: 2023-05-18
Payer: MEDICARE

## 2023-05-18 VITALS
DIASTOLIC BLOOD PRESSURE: 84 MMHG | OXYGEN SATURATION: 98 % | HEART RATE: 77 BPM | SYSTOLIC BLOOD PRESSURE: 142 MMHG | WEIGHT: 222.9 LBS | HEIGHT: 71 IN | BODY MASS INDEX: 31.21 KG/M2

## 2023-05-18 DIAGNOSIS — M77.50 TENDONITIS OF FOOT: ICD-10-CM

## 2023-05-18 DIAGNOSIS — M1A.00X0 CHRONIC GOUTY ARTHRITIS: Primary | ICD-10-CM

## 2023-05-18 RX ORDER — INDOMETHACIN 25 MG/1
25 CAPSULE ORAL 3 TIMES DAILY PRN
Qty: 30 CAPSULE | Refills: 2 | Status: SHIPPED | OUTPATIENT
Start: 2023-05-18

## 2023-05-18 NOTE — PROGRESS NOTES
"Chief Complaint  Gout    Subjective          Balbir Castro presents to [facility] PRIMARY [facility]ARE  History of Present Illness  She comes in today complaining of pain in his left foot he says his big toes been hurting it is where he is gets gout exacerbation he says is much improved at this time says otherwise he has been doing okay      Objective   Vital Signs:   /84   Pulse 77   Ht 180.3 cm (71\")   Wt 101 kg (222 lb 14.4 oz)   SpO2 98%   BMI 31.09 kg/m²     Body mass index is 31.09 kg/m².    Review of Systems   Constitutional: Negative.    HENT: Negative for congestion, dental problem, ear discharge, ear pain and sore throat.    Respiratory: Negative for apnea, chest tightness and shortness of breath.    Gastrointestinal: Negative for constipation and nausea.   Endocrine: Negative for polyuria.   Genitourinary: Negative for difficulty urinating.   Musculoskeletal: Positive for arthralgias and joint swelling. Negative for gait problem.   Skin: Negative for rash.   Hematological: Negative for adenopathy.       Past History:  Medical History: has a past medical history of Actinic keratosis, Arthritis, Asthma, Benign prostatic hyperplasia, Bronchitis, Cervicalgia, Chronic gouty arthritis, ED (erectile dysfunction), Elevated blood pressure reading, Essential hypertension, Ganglion cyst, Gout, Headache, Hearing problem, High risk medication use, Hyperlipidemia, Hypertension, Idiopathic chronic gout of multiple sites without tophus, Inflamed seborrheic keratosis, Mild intermittent asthma, Mixed hyperlipidemia, Neck pain, Obesity, Osteoarthritis of knee, Osteoarthritis of knees, bilateral, Rheumatoid arthritis, Seasonal allergies, Spinal stenosis in cervical region, Tonsillitis, and Wheezing.   Surgical History: has a past surgical history that includes Cyst Removal (Left, 2008); Tympanostomy tube placement (Left, 2008); and Ganglion cyst excision.         Current Outpatient Medications:   •  " albuterol sulfate  (90 Base) MCG/ACT inhaler, Inhale 2 puffs Every 4 (Four) Hours As Needed for Wheezing., Disp: 18 g, Rfl: 5  •  allopurinol (ZYLOPRIM) 100 MG tablet, TAKE 1 TABLET BY MOUTH DAILY, Disp: 90 tablet, Rfl: 1  •  budesonide-formoterol (SYMBICORT) 80-4.5 MCG/ACT inhaler, Inhale 2 puffs 2 (Two) Times a Day., Disp: 10.2 g, Rfl: 5  •  colchicine 0.6 MG tablet, Take 1 tablet by mouth Daily., Disp: 24 tablet, Rfl: 3  •  lisinopril (PRINIVIL,ZESTRIL) 20 MG tablet, TAKE 1 TABLET BY MOUTH DAILY, Disp: 90 tablet, Rfl: 1  •  pravastatin (PRAVACHOL) 40 MG tablet, TAKE 1 TABLET BY MOUTH DAILY, Disp: 90 tablet, Rfl: 1  •  propranolol (INDERAL) 20 MG tablet, TAKE 1 TABLET BY MOUTH DAILY, Disp: 90 tablet, Rfl: 1  •  sildenafil (REVATIO) 20 MG tablet, 2 to 5 tablets p.o. 30 minutes before sexual activity, Disp: 30 tablet, Rfl: 11  •  indomethacin (INDOCIN) 25 MG capsule, Take 1 capsule by mouth 3 (Three) Times a Day As Needed for Mild Pain., Disp: 30 capsule, Rfl: 2    Allergies: Amoxicillin, Penicillin g, and Penicillins    Physical Exam  Vitals reviewed.   Constitutional:       Appearance: Normal appearance.   HENT:      Head: Normocephalic.      Right Ear: Tympanic membrane, ear canal and external ear normal.      Left Ear: Tympanic membrane, ear canal and external ear normal.      Nose: Nose normal.      Mouth/Throat:      Pharynx: Oropharynx is clear.   Eyes:      Pupils: Pupils are equal, round, and reactive to light.   Cardiovascular:      Rate and Rhythm: Normal rate and regular rhythm.      Pulses: Normal pulses.   Pulmonary:      Effort: Pulmonary effort is normal.      Breath sounds: Normal breath sounds.   Abdominal:      General: Abdomen is flat. Bowel sounds are normal.      Palpations: Abdomen is soft.   Musculoskeletal:         General: Normal range of motion.      Comments: Base of big toes tenderness she has 5 some flexion pain   Skin:     General: Skin is warm and dry.   Neurological:       General: No focal deficit present.      Mental Status: He is alert and oriented to person, place, and time.          Result Review :                   Assessment and Plan    Diagnoses and all orders for this visit:    1. Chronic gouty arthritis (Primary)  Comments:  Continue medications we will try some as needed Indocin with this as well    2. Tendonitis of foot  Comments:  Continue to use Indocin monitor    Other orders  -     indomethacin (INDOCIN) 25 MG capsule; Take 1 capsule by mouth 3 (Three) Times a Day As Needed for Mild Pain.  Dispense: 30 capsule; Refill: 2              Follow Up   No follow-ups on file.  Patient was given instructions and counseling regarding his condition or for health maintenance advice. Please see specific information pulled into the AVS if appropriate.     Leif Beard MD  Answers for HPI/ROS submitted by the patient on 5/18/2023  What is the primary reason for your visit?: Other  Please describe your symptoms.: Gout issues  Have you had these symptoms before?: Yes  How long have you been having these symptoms?: Greater than 2 weeks  Please list any medications you are currently taking for this condition.: Allopurinol  Please describe any probable cause for these symptoms. : Hereditary

## 2023-07-31 RX ORDER — ALLOPURINOL 100 MG/1
100 TABLET ORAL DAILY
Qty: 90 TABLET | Refills: 0 | Status: SHIPPED | OUTPATIENT
Start: 2023-07-31

## 2023-08-04 RX ORDER — LISINOPRIL 20 MG/1
20 TABLET ORAL DAILY
Qty: 90 TABLET | Refills: 0 | Status: SHIPPED | OUTPATIENT
Start: 2023-08-04

## 2023-08-15 RX ORDER — DILTIAZEM HYDROCHLORIDE 60 MG/1
TABLET, FILM COATED ORAL
Qty: 10.2 G | Refills: 5 | Status: SHIPPED | OUTPATIENT
Start: 2023-08-15

## 2023-08-22 RX ORDER — PRAVASTATIN SODIUM 40 MG
40 TABLET ORAL DAILY
Qty: 90 TABLET | Refills: 0 | Status: SHIPPED | OUTPATIENT
Start: 2023-08-22

## 2023-08-30 RX ORDER — PROPRANOLOL HYDROCHLORIDE 20 MG/1
20 TABLET ORAL DAILY
Qty: 90 TABLET | Refills: 1 | Status: SHIPPED | OUTPATIENT
Start: 2023-08-30

## 2023-09-05 ENCOUNTER — OFFICE VISIT (OUTPATIENT)
Dept: FAMILY MEDICINE CLINIC | Facility: CLINIC | Age: 73
End: 2023-09-05
Payer: MEDICARE

## 2023-09-05 VITALS
WEIGHT: 222.8 LBS | DIASTOLIC BLOOD PRESSURE: 76 MMHG | OXYGEN SATURATION: 97 % | BODY MASS INDEX: 31.09 KG/M2 | SYSTOLIC BLOOD PRESSURE: 116 MMHG | HEART RATE: 81 BPM

## 2023-09-05 DIAGNOSIS — L01.00 IMPETIGO: Primary | ICD-10-CM

## 2023-09-05 PROCEDURE — 1159F MED LIST DOCD IN RCRD: CPT | Performed by: PHYSICIAN ASSISTANT

## 2023-09-05 PROCEDURE — 3074F SYST BP LT 130 MM HG: CPT | Performed by: PHYSICIAN ASSISTANT

## 2023-09-05 PROCEDURE — 3078F DIAST BP <80 MM HG: CPT | Performed by: PHYSICIAN ASSISTANT

## 2023-09-05 PROCEDURE — 99213 OFFICE O/P EST LOW 20 MIN: CPT | Performed by: PHYSICIAN ASSISTANT

## 2023-09-05 PROCEDURE — 1160F RVW MEDS BY RX/DR IN RCRD: CPT | Performed by: PHYSICIAN ASSISTANT

## 2023-09-05 RX ORDER — CLINDAMYCIN HYDROCHLORIDE 300 MG/1
300 CAPSULE ORAL 3 TIMES DAILY
Qty: 30 CAPSULE | Refills: 0 | Status: SHIPPED | OUTPATIENT
Start: 2023-09-05

## 2023-09-05 NOTE — PROGRESS NOTES
Chief Complaint  Wound Infection (Right arm x1 wk)    Subjective          Balbir Castro presents to [facility]  Wound Infection  Pertinent negatives include no abdominal pain, chest pain, coughing, fatigue, nausea or vomiting.   he has a wound on his right forearm that has been there about 1 week.  He was outside and came in and had blood on his arm but doesn't recall how he hurt it, but then the same day he says he caught it on a vacuum  nozzle.    He use some type of antibiotic ointment and gauze on it and now it has some blistering and redness surrounding the center scab.  It is a little sore surrounding the wound as well.  They are leaving town for 3 weeks on an extended vacation and the thought he better get it looked at before they leave tomorrow.     Objective   Vital Signs:   /76 (BP Location: Right arm)   Pulse 81   Wt 101 kg (222 lb 12.8 oz)   SpO2 97%   BMI 31.09 kg/m²     Body mass index is 31.09 kg/m².    Review of Systems   Constitutional:  Negative for fatigue.   Eyes:  Negative for blurred vision.   Respiratory:  Negative for cough, chest tightness and shortness of breath.    Cardiovascular:  Negative for chest pain, palpitations and leg swelling.   Gastrointestinal:  Negative for abdominal pain, constipation, diarrhea, nausea and vomiting.   Skin:  Positive for wound.   Neurological:  Negative for dizziness, tremors and headache.   Psychiatric/Behavioral:  Negative for depressed mood. The patient is not nervous/anxious.      Past History:  Medical History: has a past medical history of Actinic keratosis, Arthritis, Asthma, Benign prostatic hyperplasia, Bronchitis, Cancer (Skin), Cervicalgia, Chronic gouty arthritis, ED (erectile dysfunction), Elevated blood pressure reading, Essential hypertension, Ganglion cyst, Gout, Headache, Hearing problem, High risk medication use, Hyperlipidemia, Hypertension, Idiopathic chronic gout of multiple sites without  tophus, Inflamed seborrheic keratosis, Mild intermittent asthma, Mixed hyperlipidemia, Neck pain, Obesity, Osteoarthritis of knee, Osteoarthritis of knees, bilateral, Rheumatoid arthritis, Seasonal allergies, Spinal stenosis in cervical region, Tonsillitis, and Wheezing.   Surgical History: has a past surgical history that includes Cyst Removal (Left, 2008); Tympanostomy tube placement (Left, 2008); and Ganglion cyst excision.   Family History: family history includes Arthritis in his mother; Cancer in his maternal uncle; Hypertension in his father and mother.   Social History: reports that he quit smoking about 37 years ago. His smoking use included cigarettes. He started smoking about 53 years ago. He has never used smokeless tobacco. He reports current alcohol use of about 21.0 standard drinks per week. He reports that he does not use drugs.      Current Outpatient Medications:     albuterol sulfate  (90 Base) MCG/ACT inhaler, Inhale 2 puffs Every 4 (Four) Hours As Needed for Wheezing., Disp: 18 g, Rfl: 5    allopurinol (ZYLOPRIM) 100 MG tablet, TAKE 1 TABLET BY MOUTH DAILY, Disp: 90 tablet, Rfl: 0    colchicine 0.6 MG tablet, Take 1 tablet by mouth Daily., Disp: 24 tablet, Rfl: 3    indomethacin (INDOCIN) 25 MG capsule, Take 1 capsule by mouth 3 (Three) Times a Day As Needed for Mild Pain., Disp: 30 capsule, Rfl: 2    lisinopril (PRINIVIL,ZESTRIL) 20 MG tablet, Take 1 tablet by mouth Daily., Disp: 90 tablet, Rfl: 0    pravastatin (PRAVACHOL) 40 MG tablet, TAKE 1 TABLET BY MOUTH DAILY, Disp: 90 tablet, Rfl: 0    propranolol (INDERAL) 20 MG tablet, TAKE 1 TABLET BY MOUTH DAILY, Disp: 90 tablet, Rfl: 1    sildenafil (REVATIO) 20 MG tablet, 2 to 5 tablets p.o. 30 minutes before sexual activity, Disp: 30 tablet, Rfl: 11    Symbicort 80-4.5 MCG/ACT inhaler, INHALE 2 PUFFS BY MOUTH TWICE DAILY, Disp: 10.2 g, Rfl: 5    clindamycin (Cleocin) 300 MG capsule, Take 1 capsule by mouth 3 (Three) Times a Day., Disp: 30  capsule, Rfl: 0    mupirocin (BACTROBAN) 2 % ointment, Apply 1 application  topically to the appropriate area as directed 3 (Three) Times a Day., Disp: 30 g, Rfl: 0    Allergies: Amoxicillin, Penicillin g, and Penicillins    Physical Exam  Constitutional:       Appearance: Normal appearance.   Skin:     Findings: Abrasion, erythema and wound present.      Comments: Surrounding irritation of the skin, raised border around central wound with eschar.        Neurological:      Mental Status: He is alert and oriented to person, place, and time.   Psychiatric:         Behavior: Behavior normal.        Result Review :                   Assessment and Plan    Diagnoses and all orders for this visit:    1. Impetigo (Primary)  Assessment & Plan:  RX for mupirocin ointment and clindamycin, I will have him return when he returns from vacation, however, I did tell him if this worsens or does not improve he may need to be seen at an urgent care facility while on vacation.  He expressed understanding.       Other orders  -     clindamycin (Cleocin) 300 MG capsule; Take 1 capsule by mouth 3 (Three) Times a Day.  Dispense: 30 capsule; Refill: 0  -     mupirocin (BACTROBAN) 2 % ointment; Apply 1 application  topically to the appropriate area as directed 3 (Three) Times a Day.  Dispense: 30 g; Refill: 0        Follow Up   Return in about 4 weeks (around 10/3/2023) for Recheck.  Patient was given instructions and counseling regarding his condition or for health maintenance advice. Please see specific information pulled into the AVS if appropriate.     Rajni Cordova PA-C

## 2023-09-05 NOTE — ASSESSMENT & PLAN NOTE
RX for mupirocin ointment and clindamycin, I will have him return when he returns from vacation, however, I did tell him if this worsens or does not improve he may need to be seen at an urgent care facility while on vacation.  He expressed understanding.

## 2023-10-23 RX ORDER — ALLOPURINOL 100 MG/1
100 TABLET ORAL DAILY
Qty: 90 TABLET | Refills: 0 | Status: SHIPPED | OUTPATIENT
Start: 2023-10-23

## 2023-10-31 RX ORDER — LISINOPRIL 20 MG/1
20 TABLET ORAL DAILY
Qty: 30 TABLET | Refills: 0 | Status: SHIPPED | OUTPATIENT
Start: 2023-10-31 | End: 2023-11-01

## 2023-10-31 NOTE — TELEPHONE ENCOUNTER
"    Caller: Balbir Castro \"Nick\"    Relationship: Self    Best call back number: [phone]     Requested Prescriptions:   Requested Prescriptions     Pending Prescriptions Disp Refills    lisinopril (PRINIVIL,ZESTRIL) 20 MG tablet [Pharmacy Med Name: LISINOPRIL 20MG TABLETS] 90 tablet 0     Sig: TAKE 1 TABLET BY MOUTH DAILY        Pharmacy where request should be sent: [id number] - [facility] [place] - [address] - [phone] [facility] [phone]      Last office visit with prescribing clinician: 7/21/2023   Last telemedicine visit with prescribing clinician: Visit date not found   Next office visit with prescribing clinician: Visit date not found     Additional details provided by patient: OUT OF MEDICATION, HAS APPOINTMENT WITH [facility] ON NOV 8 BUT OUT OF MEDICATION NOW     Does the patient have less than a 3 day supply:  [x] Yes  [] No    Would you like a call back once the refill request has been completed: [] Yes [x] No    If the office needs to give you a call back, can they leave a voicemail: [] Yes [x] No    Vinayak Goddard Rep   10/31/23 08:27 EDT       "

## 2023-11-01 RX ORDER — LISINOPRIL 20 MG/1
20 TABLET ORAL DAILY
Qty: 90 TABLET | Refills: 0 | Status: SHIPPED | OUTPATIENT
Start: 2023-11-01

## 2023-11-08 ENCOUNTER — OFFICE VISIT (OUTPATIENT)
Dept: FAMILY MEDICINE CLINIC | Facility: CLINIC | Age: 73
End: 2023-11-08
Payer: MEDICARE

## 2023-11-08 VITALS
OXYGEN SATURATION: 98 % | SYSTOLIC BLOOD PRESSURE: 150 MMHG | WEIGHT: 228 LBS | HEIGHT: 71 IN | BODY MASS INDEX: 31.92 KG/M2 | DIASTOLIC BLOOD PRESSURE: 82 MMHG | HEART RATE: 78 BPM

## 2023-11-08 DIAGNOSIS — L98.9 SKIN LESION OF FACE: ICD-10-CM

## 2023-11-08 DIAGNOSIS — E66.9 CLASS 1 OBESITY WITH SERIOUS COMORBIDITY AND BODY MASS INDEX (BMI) OF 31.0 TO 31.9 IN ADULT, UNSPECIFIED OBESITY TYPE: ICD-10-CM

## 2023-11-08 DIAGNOSIS — I10 ESSENTIAL HYPERTENSION: Primary | ICD-10-CM

## 2023-11-08 DIAGNOSIS — Z11.59 NEED FOR HEPATITIS C SCREENING TEST: ICD-10-CM

## 2023-11-08 DIAGNOSIS — E78.2 MIXED HYPERLIPIDEMIA: ICD-10-CM

## 2023-11-08 DIAGNOSIS — R79.9 ABNORMAL BLOOD CHEMISTRY: ICD-10-CM

## 2023-11-08 DIAGNOSIS — Z12.5 PROSTATE CANCER SCREENING: ICD-10-CM

## 2023-11-08 DIAGNOSIS — Z23 NEED FOR VACCINATION: ICD-10-CM

## 2023-11-08 DIAGNOSIS — M72.2 PLANTAR FASCIITIS OF RIGHT FOOT: ICD-10-CM

## 2023-11-08 PROBLEM — E66.811 CLASS 1 OBESITY WITH SERIOUS COMORBIDITY AND BODY MASS INDEX (BMI) OF 31.0 TO 31.9 IN ADULT: Status: ACTIVE | Noted: 2023-11-08

## 2023-11-08 RX ORDER — LISINOPRIL 20 MG/1
20 TABLET ORAL DAILY
Qty: 90 TABLET | Refills: 0 | Status: SHIPPED | OUTPATIENT
Start: 2023-11-08

## 2023-11-08 NOTE — ASSESSMENT & PLAN NOTE
His plantar fasciitis seems to be improving.  I recommend that he continue with the stretching and ice that he has been doing.  I would recommend that he see podiatry if symptoms persist.

## 2023-11-08 NOTE — ASSESSMENT & PLAN NOTE
Hyperlipidemia is chronic.  Levels will be checked on the labs, and he will continue current medication as prescribed.

## 2023-11-08 NOTE — PROGRESS NOTES
"    Office Note     Name: Balbir Castro    : 1950     MRN: [id number]     Chief Complaint  place under eye    Subjective     History of Present Illness:  Balbir Castro is a 73 y.o. male who presents today for eval of a scaling lesion under her L eye for several month.  He states that it was frozen before, and it went away for about a year.  He has had a large skin cancer removed from his head, so he sees derm every 6 months.  His next appointment will be in February, so he wasn't sure if he needed to be seen sooner.    He also complains of soreness on the bottom of his right foot off and on.  He states that it got worse after he had been walking more on [place] 3[place].  He states that 2-1/2 weeks ago he had severe pain in his heel that felt like a \"stone bruise.\"  He states that he got inserts for his shoes and has been wearing supportive shoes.  He states that he has also been using ice on it as well or as doing stretches and taking indomethacin.  He states that all those things have helped, and it has slowly been improving.      Past Medical History:   Past Medical History:   Diagnosis Date    Actinic keratosis     Arthritis     Asthma     Benign prostatic hyperplasia     Bronchitis     Cancer Skin    Cervicalgia     Chronic gouty arthritis     ED (erectile dysfunction)     Elevated blood pressure reading     Essential hypertension     Ganglion cyst     WRIST    Gout     Headache     Hearing problem     High risk medication use     Hyperlipidemia     Hypertension     Idiopathic chronic gout of multiple sites without tophus     Inflamed seborrheic keratosis     Mild intermittent asthma     Mixed hyperlipidemia     Neck pain     Obesity     Osteoarthritis of knee     Osteoarthritis of knees, bilateral     Rheumatoid arthritis     Seasonal allergies     Spinal stenosis in cervical region     Tonsillitis     Wheezing        Past Surgical History:   Past Surgical History:   Procedure Laterality Date "    CYST REMOVAL Left 2008    x2 Cyst- Hand/Finger--[place], [place]    GANGLION CYST EXCISION      WRIST    TYMPANOSTOMY TUBE PLACEMENT Left        Family History:   Family History   Problem Relation Age of Onset    Arthritis Mother     Hypertension Mother     Hypertension Father     Cancer Maternal Uncle        Social History:   Social History     Socioeconomic History    Marital status:    Tobacco Use    Smoking status: Former     Packs/day: 0.50     Years: 15.00     Additional pack years: 0.00     Total pack years: 7.50     Types: Cigarettes     Start date: 1970     Quit date: 1986     Years since quittin.8     Passive exposure: Never    Smokeless tobacco: Never   Substance and Sexual Activity    Alcohol use: Yes     Alcohol/week: 21.0 standard drinks of alcohol     Types: 14 Cans of beer, 7 Shots of liquor per week    Drug use: No    Sexual activity: Yes     Partners: Female     Birth control/protection: None       Immunizations:   Immunization History   Administered Date(s) Administered    ABRYSVO 10/19/2023    COVID-19 (MODERNA) 1st,2nd,3rd Dose Monovalent 2021, 2021    COVID-19 (MODERNA) BIVALENT 12+YRS 10/07/2022    COVID-19 (MODERNA) Monovalent Original Booster 10/26/2021    Fluad Quad 65+ 2022    Fluzone High-Dose 65+yrs 10/16/2023    Fluzone Quad >6mos (Multi-dose) 2019, 10/15/2021    Influenza, Unspecified 10/15/2021    Pneumococcal Conjugate 13-Valent (PCV13) 2017    Pneumococcal Polysaccharide (PPSV23) 2018    Tdap 2023        Medications:     Current Outpatient Medications:     albuterol sulfate  (90 Base) MCG/ACT inhaler, Inhale 2 puffs Every 4 (Four) Hours As Needed for Wheezing., Disp: 18 g, Rfl: 5    allopurinol (ZYLOPRIM) 100 MG tablet, TAKE 1 TABLET BY MOUTH DAILY, Disp: 90 tablet, Rfl: 0    indomethacin (INDOCIN) 25 MG capsule, Take 1 capsule by mouth 3 (Three) Times a Day As Needed for Mild Pain., Disp: 30 capsule, Rfl: 2     "lisinopril (PRINIVIL,ZESTRIL) 20 MG tablet, Take 1 tablet by mouth Daily., Disp: 90 tablet, Rfl: 0    mupirocin (BACTROBAN) 2 % ointment, Apply 1 application  topically to the appropriate area as directed 3 (Three) Times a Day., Disp: 30 g, Rfl: 0    pravastatin (PRAVACHOL) 40 MG tablet, TAKE 1 TABLET BY MOUTH DAILY, Disp: 90 tablet, Rfl: 0    propranolol (INDERAL) 20 MG tablet, TAKE 1 TABLET BY MOUTH DAILY, Disp: 90 tablet, Rfl: 1    sildenafil (REVATIO) 20 MG tablet, 2 to 5 tablets p.o. 30 minutes before sexual activity, Disp: 30 tablet, Rfl: 11    Symbicort 80-4.5 MCG/ACT inhaler, INHALE 2 PUFFS BY MOUTH TWICE DAILY, Disp: 10.2 g, Rfl: 5    Allergies:   Allergies   Allergen Reactions    Amoxicillin Hives    Penicillin G Rash    Penicillins Rash       Objective     Vital Signs  /82   Pulse 78   Ht 180.3 cm (71\")   Wt 103 kg (228 lb)   SpO2 98%   BMI 31.80 kg/m²   Estimated body mass index is 31.8 kg/m² as calculated from the following:    Height as of this encounter: 180.3 cm (71\").    Weight as of this encounter: 103 kg (228 lb).    BMI is >= 30 and <35. (Class 1 Obesity). The following options were offered after discussion;: exercise counseling/recommendations and nutrition counseling/recommendations      Physical Exam  Vitals and nursing note reviewed.   Constitutional:       General: He is not in acute distress.     Appearance: Normal appearance. He is not ill-appearing.   HENT:      Head: Normocephalic and atraumatic.   Cardiovascular:      Rate and Rhythm: Normal rate and regular rhythm.      Pulses: Normal pulses.      Heart sounds: Normal heart sounds.   Pulmonary:      Effort: Pulmonary effort is normal. No respiratory distress.      Breath sounds: Normal breath sounds.   Musculoskeletal:      Right lower leg: No edema.      Left lower leg: No edema.   Skin:     General: Skin is warm and dry.          Neurological:      General: No focal deficit present.      Mental Status: He is alert and " oriented to person, place, and time.      Motor: No weakness.      Coordination: Coordination normal.   Psychiatric:         Mood and Affect: Mood normal.         Behavior: Behavior normal.       Assessment and Plan     Assessment/Plan:  Diagnoses and all orders for this visit:    1. Essential hypertension (Primary)  Assessment & Plan:  Hypertension is chronic and not controlled on today's visit.  He states that he has been doing well with his medications, and it has been in the normal range when he checks it at home.  I recommend that he monitor levels over the next few weeks, and we can discuss them on his wellness visit in December.  He is in agreement with this plan.    Orders:  -     lisinopril (PRINIVIL,ZESTRIL) 20 MG tablet; Take 1 tablet by mouth Daily.  Dispense: 90 tablet; Refill: 0  -     CBC Auto Differential; Future  -     Comprehensive Metabolic Panel; Future  -     Hemoglobin A1c; Future  -     Thyroid Cascade Profile; Future  -     CBC Auto Differential  -     Comprehensive Metabolic Panel  -     Hemoglobin A1c  -     Thyroid Cascade Profile    2. Skin lesion of face  Assessment & Plan:  The skin lesion of his face appears to need treatment, but I believe it would be best for him to discuss with dermatology since it has been frozen previously.  He is in agreement to schedule with his dermatologist.      3. Plantar fasciitis of right foot  Assessment & Plan:  His plantar fasciitis seems to be improving.  I recommend that he continue with the stretching and ice that he has been doing.  I would recommend that he see podiatry if symptoms persist.      4. Mixed hyperlipidemia  Assessment & Plan:  Hyperlipidemia is chronic.  Levels will be checked on the labs, and he will continue current medication as prescribed.    Orders:  -     CBC Auto Differential; Future  -     Comprehensive Metabolic Panel; Future  -     Lipid Panel; Future  -     Hemoglobin A1c; Future  -     Thyroid Cascade Profile; Future  -      CBC Auto Differential  -     Comprehensive Metabolic Panel  -     Lipid Panel  -     Hemoglobin A1c  -     Thyroid Cascade Profile    5. Class 1 obesity with serious comorbidity and body mass index (BMI) of 31.0 to 31.9 in adult, unspecified obesity type  Assessment & Plan:  Patient's (Body mass index is 31.8 kg/m².) indicates that they are morbidly/severely obese (BMI > 40 or > 35 with obesity - related health condition) with health conditions that include hypertension and dyslipidemias . Weight is unchanged. BMI  is above average; BMI management plan is completed.   I recommend continuing to monitor her diet and with regular exercise .       6. Need for hepatitis C screening test  -     HCV Antibody Rfx To Qnt PCR; Future  -     HCV Antibody Rfx To Qnt PCR    7. Prostate cancer screening  -     PSA Screen; Future  -     PSA Screen    8. Abnormal blood chemistry  -     Hemoglobin A1c; Future  -     Hemoglobin A1c    9. Need for vaccination  -     Tdap Vaccine Greater Than or Equal To 6yo IM        Follow Up  Return for Medicare Wellness via telehealth in at least 3 weeks but before the end of the year.    Noemi Welch PA-C  [facility] Internal Medicine [facility]

## 2023-11-08 NOTE — ASSESSMENT & PLAN NOTE
The skin lesion of his face appears to need treatment, but I believe it would be best for him to discuss with dermatology since it has been frozen previously.  He is in agreement to schedule with his dermatologist.

## 2023-11-08 NOTE — ASSESSMENT & PLAN NOTE
Hypertension is chronic and not controlled on today's visit.  He states that he has been doing well with his medications, and it has been in the normal range when he checks it at home.  I recommend that he monitor levels over the next few weeks, and we can discuss them on his wellness visit in December.  He is in agreement with this plan.

## 2023-11-08 NOTE — ASSESSMENT & PLAN NOTE
Patient's (Body mass index is 31.8 kg/m².) indicates that they are morbidly/severely obese (BMI > 40 or > 35 with obesity - related health condition) with health conditions that include hypertension and dyslipidemias . Weight is unchanged. BMI  is above average; BMI management plan is completed.   I recommend continuing to monitor her diet and with regular exercise .

## 2023-11-09 LAB
ALBUMIN SERPL-MCNC: 4.6 G/DL (ref 3.8–4.8)
ALBUMIN/GLOB SERPL: 1.9 {RATIO} (ref 1.2–2.2)
ALP SERPL-CCNC: 50 IU/L (ref 44–121)
ALT SERPL-CCNC: 20 IU/L (ref 0–44)
AST SERPL-CCNC: 20 IU/L (ref 0–40)
BASOPHILS # BLD AUTO: 0.1 X10E3/UL (ref 0–0.2)
BASOPHILS NFR BLD AUTO: 2 %
BILIRUB SERPL-MCNC: 0.8 MG/DL (ref 0–1.2)
BUN SERPL-MCNC: 19 MG/DL (ref 8–27)
BUN/CREAT SERPL: 17 (ref 10–24)
CALCIUM SERPL-MCNC: 9.8 MG/DL (ref 8.6–10.2)
CHLORIDE SERPL-SCNC: 99 MMOL/L (ref 96–106)
CHOLEST SERPL-MCNC: 215 MG/DL (ref 100–199)
CO2 SERPL-SCNC: 24 MMOL/L (ref 20–29)
CREAT SERPL-MCNC: 1.11 MG/DL (ref 0.76–1.27)
EGFRCR SERPLBLD CKD-EPI 2021: 71 ML/MIN/1.73
EOSINOPHIL # BLD AUTO: 0.3 X10E3/UL (ref 0–0.4)
EOSINOPHIL NFR BLD AUTO: 5 %
ERYTHROCYTE [DISTWIDTH] IN BLOOD BY AUTOMATED COUNT: 11.8 % (ref 11.6–15.4)
GLOBULIN SER CALC-MCNC: 2.4 G/DL (ref 1.5–4.5)
GLUCOSE SERPL-MCNC: 98 MG/DL (ref 70–99)
HBA1C MFR BLD: 5.6 % (ref 4.8–5.6)
HCT VFR BLD AUTO: 38.9 % (ref 37.5–51)
HCV AB SERPL QL IA: NORMAL
HCV IGG SERPL QL IA: NON REACTIVE
HDLC SERPL-MCNC: 61 MG/DL
HGB BLD-MCNC: 13.6 G/DL (ref 13–17.7)
IMM GRANULOCYTES # BLD AUTO: 0 X10E3/UL (ref 0–0.1)
IMM GRANULOCYTES NFR BLD AUTO: 1 %
LDLC SERPL CALC-MCNC: 141 MG/DL (ref 0–99)
LYMPHOCYTES # BLD AUTO: 1.4 X10E3/UL (ref 0.7–3.1)
LYMPHOCYTES NFR BLD AUTO: 23 %
MCH RBC QN AUTO: 33.1 PG (ref 26.6–33)
MCHC RBC AUTO-ENTMCNC: 35 G/DL (ref 31.5–35.7)
MCV RBC AUTO: 95 FL (ref 79–97)
MONOCYTES # BLD AUTO: 0.5 X10E3/UL (ref 0.1–0.9)
MONOCYTES NFR BLD AUTO: 8 %
NEUTROPHILS # BLD AUTO: 3.7 X10E3/UL (ref 1.4–7)
NEUTROPHILS NFR BLD AUTO: 61 %
PLATELET # BLD AUTO: 198 X10E3/UL (ref 150–450)
POTASSIUM SERPL-SCNC: 5.2 MMOL/L (ref 3.5–5.2)
PROT SERPL-MCNC: 7 G/DL (ref 6–8.5)
PSA SERPL-MCNC: 2.1 NG/ML (ref 0–4)
RBC # BLD AUTO: 4.11 X10E6/UL (ref 4.14–5.8)
SODIUM SERPL-SCNC: 138 MMOL/L (ref 134–144)
TRIGL SERPL-MCNC: 72 MG/DL (ref 0–149)
TSH SERPL DL<=0.005 MIU/L-ACNC: 1.48 UIU/ML (ref 0.45–4.5)
VLDLC SERPL CALC-MCNC: 13 MG/DL (ref 5–40)
WBC # BLD AUTO: 6 X10E3/UL (ref 3.4–10.8)

## 2023-11-24 RX ORDER — PRAVASTATIN SODIUM 40 MG
40 TABLET ORAL DAILY
Qty: 90 TABLET | Refills: 0 | Status: SHIPPED | OUTPATIENT
Start: 2023-11-24

## 2023-11-27 ENCOUNTER — OFFICE VISIT (OUTPATIENT)
Dept: FAMILY MEDICINE CLINIC | Facility: CLINIC | Age: 73
End: 2023-11-27
Payer: MEDICARE

## 2023-11-27 VITALS
OXYGEN SATURATION: 98 % | HEIGHT: 71 IN | BODY MASS INDEX: 31.96 KG/M2 | DIASTOLIC BLOOD PRESSURE: 70 MMHG | HEART RATE: 82 BPM | WEIGHT: 228.3 LBS | SYSTOLIC BLOOD PRESSURE: 120 MMHG

## 2023-11-27 DIAGNOSIS — M79.672 PAIN IN BOTH FEET: Primary | ICD-10-CM

## 2023-11-27 DIAGNOSIS — M79.671 PAIN IN BOTH FEET: Primary | ICD-10-CM

## 2023-11-27 PROCEDURE — 1159F MED LIST DOCD IN RCRD: CPT | Performed by: PHYSICIAN ASSISTANT

## 2023-11-27 PROCEDURE — 3078F DIAST BP <80 MM HG: CPT | Performed by: PHYSICIAN ASSISTANT

## 2023-11-27 PROCEDURE — 99213 OFFICE O/P EST LOW 20 MIN: CPT | Performed by: PHYSICIAN ASSISTANT

## 2023-11-27 PROCEDURE — 1160F RVW MEDS BY RX/DR IN RCRD: CPT | Performed by: PHYSICIAN ASSISTANT

## 2023-11-27 PROCEDURE — 3074F SYST BP LT 130 MM HG: CPT | Performed by: PHYSICIAN ASSISTANT

## 2023-11-27 RX ORDER — INDOMETHACIN 25 MG/1
25 CAPSULE ORAL 3 TIMES DAILY PRN
Qty: 30 CAPSULE | Refills: 2 | Status: SHIPPED | OUTPATIENT
Start: 2023-11-27

## 2023-11-27 NOTE — PROGRESS NOTES
Office Note     Name: Balbir Castro    : 1950     MRN: [id number]     Chief Complaint  feet pain    Subjective     History of Present Illness:  Balbir Castro is a 73 y.o. male who presents today for eval of bilateral foot pain for several weeks.  He states that it was just one side starting off, but now he has developed pain in both feet.  He states that the pain is mostly in his heels, and on the right foot, it is mostly along his Achilles tendon.  He denies any history of injury, but he admits muscle spasms in his legs and feet.  He states that he has been using ice and supportive insoles.  He states that he is always had flat feet.    Past Medical History:   Past Medical History:   Diagnosis Date    Actinic keratosis     Arthritis     Asthma     Benign prostatic hyperplasia     Bronchitis     Cancer Skin    Cervicalgia     Chronic gouty arthritis     ED (erectile dysfunction)     Elevated blood pressure reading     Essential hypertension     Ganglion cyst     WRIST    Gout     Headache     Hearing problem     High risk medication use     Hyperlipidemia     Hypertension     Idiopathic chronic gout of multiple sites without tophus     Inflamed seborrheic keratosis     Mild intermittent asthma     Mixed hyperlipidemia     Neck pain     Obesity     Osteoarthritis of knee     Osteoarthritis of knees, bilateral     Rheumatoid arthritis     Seasonal allergies     Spinal stenosis in cervical region     Tonsillitis     Wheezing        Past Surgical History:   Past Surgical History:   Procedure Laterality Date    CYST REMOVAL Left 2008    x2 Cyst- Hand/Finger--[place]    GANGLION CYST EXCISION      WRIST    TYMPANOSTOMY TUBE PLACEMENT Left        Family History:   Family History   Problem Relation Age of Onset    Arthritis Mother     Hypertension Mother     Hypertension Father     Cancer Maternal Uncle        Social History:   Social History     Socioeconomic History    Marital status:    Tobacco  Use    Smoking status: Former     Packs/day: 0.50     Years: 15.00     Additional pack years: 0.00     Total pack years: 7.50     Types: Cigarettes     Start date: 1970     Quit date: 1986     Years since quittin.9     Passive exposure: Never    Smokeless tobacco: Never   Substance and Sexual Activity    Alcohol use: Yes     Alcohol/week: 21.0 standard drinks of alcohol     Types: 14 Cans of beer, 7 Shots of liquor per week    Drug use: No    Sexual activity: Yes     Partners: Female     Birth control/protection: None       Immunizations:   Immunization History   Administered Date(s) Administered    ABRYSVO (RSV, pregnant 32-36 wks) 10/19/2023    COVID-19 (MODERNA) 1st,2nd,3rd Dose Monovalent 2021, 2021    COVID-19 (MODERNA) BIVALENT 12+YRS 10/07/2022    COVID-19 (MODERNA) Monovalent Original Booster 10/26/2021    COVID-19 F23 (PFIZER) 12YRS+ (COMIRNATY) 2023    Fluad Quad 65+ 2022    Fluzone High-Dose 65+yrs 10/16/2023    Fluzone Quad >6mos (Multi-dose) 2019, 10/15/2021    Influenza, Unspecified 10/15/2021    Pneumococcal Conjugate 13-Valent (PCV13) 2017    Pneumococcal Polysaccharide (PPSV23) 2018    Tdap 2023        Medications:     Current Outpatient Medications:     indomethacin (INDOCIN) 25 MG capsule, Take 1 capsule by mouth 3 (Three) Times a Day As Needed for Mild Pain., Disp: 30 capsule, Rfl: 2    albuterol sulfate  (90 Base) MCG/ACT inhaler, Inhale 2 puffs Every 4 (Four) Hours As Needed for Wheezing., Disp: 18 g, Rfl: 5    allopurinol (ZYLOPRIM) 100 MG tablet, TAKE 1 TABLET BY MOUTH DAILY, Disp: 90 tablet, Rfl: 0    lisinopril (PRINIVIL,ZESTRIL) 20 MG tablet, Take 1 tablet by mouth Daily., Disp: 90 tablet, Rfl: 0    mupirocin (BACTROBAN) 2 % ointment, Apply 1 application  topically to the appropriate area as directed 3 (Three) Times a Day., Disp: 30 g, Rfl: 0    pravastatin (PRAVACHOL) 40 MG tablet, Take 1 tablet by mouth Daily., Disp: 90  "tablet, Rfl: 0    propranolol (INDERAL) 20 MG tablet, TAKE 1 TABLET BY MOUTH DAILY, Disp: 90 tablet, Rfl: 1    sildenafil (REVATIO) 20 MG tablet, 2 to 5 tablets p.o. 30 minutes before sexual activity, Disp: 30 tablet, Rfl: 11    Symbicort 80-4.5 MCG/ACT inhaler, INHALE 2 PUFFS BY MOUTH TWICE DAILY, Disp: 10.2 g, Rfl: 5    Allergies:   Allergies   Allergen Reactions    Amoxicillin Hives    Penicillin G Rash    Penicillins Rash       Objective     Vital Signs  /70 (BP Location: Left arm, Patient Position: Sitting, Cuff Size: Large Adult)   Pulse 82   Ht 180.3 cm (71\")   Wt 104 kg (228 lb 4.8 oz)   SpO2 98%   BMI 31.84 kg/m²   Estimated body mass index is 31.84 kg/m² as calculated from the following:    Height as of this encounter: 180.3 cm (71\").    Weight as of this encounter: 104 kg (228 lb 4.8 oz).        Physical Exam  Vitals and nursing note reviewed.   Constitutional:       General: He is not in acute distress.     Appearance: Normal appearance. He is not ill-appearing.   HENT:      Head: Normocephalic and atraumatic.   Cardiovascular:      Rate and Rhythm: Normal rate and regular rhythm.      Pulses: Normal pulses.           Dorsalis pedis pulses are 2+ on the right side and 2+ on the left side.        Posterior tibial pulses are 2+ on the right side and 2+ on the left side.      Heart sounds: Normal heart sounds.   Pulmonary:      Effort: Pulmonary effort is normal. No respiratory distress.      Breath sounds: Normal breath sounds.   Musculoskeletal:      Right lower leg: No edema.      Left lower leg: No edema.      Right foot: Normal range of motion.      Left foot: Normal range of motion.   Feet:      Right foot:      Skin integrity: Skin integrity normal.      Left foot:      Skin integrity: Skin integrity normal.   Skin:     General: Skin is warm and dry.   Neurological:      General: No focal deficit present.      Mental Status: He is alert and oriented to person, place, and time.      Motor: " No weakness.      Coordination: Coordination normal.   Psychiatric:         Mood and Affect: Mood normal.         Behavior: Behavior normal.       Assessment and Plan     Assessment/Plan:  Diagnoses and all orders for this visit:    1. Pain in both feet (Primary)  Assessment & Plan:  His foot pain has been going on for at least 2 months, and it has been progressing.  I recommend that he see the podiatrist for further evaluation.  Information given in the AVS.  I also recommend anti-inflammatory medication, ice, and stretching.  He is in agreement with the plan.    Orders:  -     XR Foot 3+ View Bilateral; Future  -     indomethacin (INDOCIN) 25 MG capsule; Take 1 capsule by mouth 3 (Three) Times a Day As Needed for Mild Pain.  Dispense: 30 capsule; Refill: 2        Follow Up  Return for next scheduled f/u or sooner PRN.    Noemi Welch PA-C  [facility]

## 2023-11-27 NOTE — PATIENT INSTRUCTIONS
Dr. Rachel  [facility]  [address]  [address]  [facility] [place] [id number]

## 2023-11-28 PROBLEM — M79.671 PAIN IN BOTH FEET: Status: ACTIVE | Noted: 2023-11-28

## 2023-11-28 PROBLEM — M79.672 PAIN IN BOTH FEET: Status: ACTIVE | Noted: 2023-11-28

## 2023-11-28 NOTE — ASSESSMENT & PLAN NOTE
His foot pain has been going on for at least 2 months, and it has been progressing.  I recommend that he see the podiatrist for further evaluation.  Information given in the AVS.  I also recommend anti-inflammatory medication, ice, and stretching.  He is in agreement with the plan.

## 2023-12-04 ENCOUNTER — OFFICE VISIT (OUTPATIENT)
Dept: FAMILY MEDICINE CLINIC | Facility: CLINIC | Age: 73
End: 2023-12-04
Payer: MEDICARE

## 2023-12-04 VITALS
HEIGHT: 71 IN | HEART RATE: 66 BPM | WEIGHT: 223 LBS | BODY MASS INDEX: 31.22 KG/M2 | SYSTOLIC BLOOD PRESSURE: 132 MMHG | DIASTOLIC BLOOD PRESSURE: 80 MMHG | OXYGEN SATURATION: 98 %

## 2023-12-04 DIAGNOSIS — I10 ESSENTIAL HYPERTENSION: Primary | ICD-10-CM

## 2023-12-04 PROCEDURE — 1159F MED LIST DOCD IN RCRD: CPT | Performed by: PHYSICIAN ASSISTANT

## 2023-12-04 PROCEDURE — 3079F DIAST BP 80-89 MM HG: CPT | Performed by: PHYSICIAN ASSISTANT

## 2023-12-04 PROCEDURE — 1160F RVW MEDS BY RX/DR IN RCRD: CPT | Performed by: PHYSICIAN ASSISTANT

## 2023-12-04 PROCEDURE — 99213 OFFICE O/P EST LOW 20 MIN: CPT | Performed by: PHYSICIAN ASSISTANT

## 2023-12-04 PROCEDURE — 3075F SYST BP GE 130 - 139MM HG: CPT | Performed by: PHYSICIAN ASSISTANT

## 2023-12-04 RX ORDER — CHLORCYCLIZINE HYDROCHLORIDE AND PSEUDOEPHEDRINE HYDROCHLORIDE 25; 60 MG/1; MG/1
1 TABLET ORAL 3 TIMES DAILY
COMMUNITY
End: 2023-12-04

## 2023-12-04 NOTE — ASSESSMENT & PLAN NOTE
Patient's blood pressure was within normal range this date and it was checked twice.  Advised patient to bring his blood pressure cuff to the clinic so that it can be compared to manual reading.  Provided patient with a log and recommended he track his blood pressure for the next 2 weeks then return to clinic for follow-up.  Discussed signs of worsening symptoms and advised ER should they occur he acknowledged understanding.

## 2023-12-04 NOTE — PROGRESS NOTES
"Chief Complaint  Hypertension    Subjective        Balbir Castro presents to [facility]  History of Present Illness  Patient reports today secondary to having elevated blood pressure readings at home yesterday.  Patient states he developed a nosebleed so he decided to check his blood pressure and it was 180/90.  Patient reports with a log from yesterday and all readings are elevated.  Patient denies any chest pain, shortness of breath or palpitations.  Denies any headache, vision changes or further nosebleeds.  Patient reports he takes his blood pressure medication daily  Hypertension        Objective   Vital Signs:  /80   Pulse 66   Ht 180.3 cm (71\")   Wt 101 kg (223 lb)   SpO2 98%   BMI 31.10 kg/m²   Estimated body mass index is 31.1 kg/m² as calculated from the following:    Height as of this encounter: 180.3 cm (71\").    Weight as of this encounter: 101 kg (223 lb).               Physical Exam  Vitals and nursing note reviewed.   Constitutional:       General: He is not in acute distress.     Appearance: Normal appearance.   HENT:      Head: Normocephalic.      Right Ear: Hearing normal.      Left Ear: Hearing normal.   Eyes:      Pupils: Pupils are equal, round, and reactive to light.   Cardiovascular:      Rate and Rhythm: Normal rate and regular rhythm.   Pulmonary:      Effort: Pulmonary effort is normal. No respiratory distress.   Skin:     General: Skin is warm and dry.   Neurological:      Mental Status: He is alert.   Psychiatric:         Mood and Affect: Mood normal.        Result Review :                   Assessment and Plan   Diagnoses and all orders for this visit:    1. Essential hypertension (Primary)  Assessment & Plan:  Patient's blood pressure was within normal range this date and it was checked twice.  Advised patient to bring his blood pressure cuff to the clinic so that it can be compared to manual reading.  Provided patient with a log and recommended " he track his blood pressure for the next 2 weeks then return to clinic for follow-up.  Discussed signs of worsening symptoms and advised ER should they occur he acknowledged understanding.                 Follow Up   Return in about 2 weeks (around 12/18/2023).  Patient was given instructions and counseling regarding his condition or for health maintenance advice. Please see specific information pulled into the AVS if appropriate.

## 2023-12-06 ENCOUNTER — TELEMEDICINE (OUTPATIENT)
Dept: FAMILY MEDICINE CLINIC | Facility: CLINIC | Age: 73
End: 2023-12-06
Payer: MEDICARE

## 2023-12-06 VITALS
BODY MASS INDEX: 30.8 KG/M2 | HEIGHT: 71 IN | DIASTOLIC BLOOD PRESSURE: 82 MMHG | SYSTOLIC BLOOD PRESSURE: 151 MMHG | WEIGHT: 220 LBS | HEART RATE: 59 BPM

## 2023-12-06 DIAGNOSIS — Z87.891 HISTORY OF TOBACCO ABUSE: ICD-10-CM

## 2023-12-06 DIAGNOSIS — I10 ESSENTIAL HYPERTENSION: ICD-10-CM

## 2023-12-06 DIAGNOSIS — Z00.00 WELLNESS EXAMINATION: Primary | ICD-10-CM

## 2023-12-06 PROCEDURE — G0402 INITIAL PREVENTIVE EXAM: HCPCS | Performed by: PHYSICIAN ASSISTANT

## 2023-12-06 PROCEDURE — 3077F SYST BP >= 140 MM HG: CPT | Performed by: PHYSICIAN ASSISTANT

## 2023-12-06 PROCEDURE — 99214 OFFICE O/P EST MOD 30 MIN: CPT | Performed by: PHYSICIAN ASSISTANT

## 2023-12-06 PROCEDURE — 3079F DIAST BP 80-89 MM HG: CPT | Performed by: PHYSICIAN ASSISTANT

## 2023-12-06 PROCEDURE — 96160 PT-FOCUSED HLTH RISK ASSMT: CPT | Performed by: PHYSICIAN ASSISTANT

## 2023-12-06 NOTE — PROGRESS NOTES
Patient was seen today through synchronous audio/video technology. Verbal consent was obtained. The patient was located at home. Noemi Welch PA-C was located at [facility] in [place], [place]. Vitals signs were obtained by patient and recorded.       The ABCs of the Annual Wellness Visit  Subsequent Medicare Wellness Visit    Subjective    Balbir Castro is a 73 y.o. male who presents for a Subsequent Medicare Wellness Visit.    The following portions of the patient's history were reviewed and   updated as appropriate: allergies, current medications, past family history, past medical history, past social history, past surgical history, and problem list.    Compared to one year ago, the patient feels his physical   health is worse. After his MOHS surgery and plantar fasciitis.    Compared to one year ago, the patient feels his mental   health is the same.    Recent Hospitalizations:  He was not admitted to the hospital during the last year.       Current Medical Providers:  Patient Care Team:  Noemi Welch PA-C as PCP - General (Physician Assistant)    Outpatient Medications Prior to Visit   Medication Sig Dispense Refill    albuterol sulfate  (90 Base) MCG/ACT inhaler Inhale 2 puffs Every 4 (Four) Hours As Needed for Wheezing. 18 g 5    allopurinol (ZYLOPRIM) 100 MG tablet TAKE 1 TABLET BY MOUTH DAILY 90 tablet 0    indomethacin (INDOCIN) 25 MG capsule Take 1 capsule by mouth 3 (Three) Times a Day As Needed for Mild Pain. 30 capsule 2    lisinopril (PRINIVIL,ZESTRIL) 20 MG tablet Take 1 tablet by mouth Daily. 90 tablet 0    pravastatin (PRAVACHOL) 40 MG tablet Take 1 tablet by mouth Daily. 90 tablet 0    propranolol (INDERAL) 20 MG tablet TAKE 1 TABLET BY MOUTH DAILY 90 tablet 1    sildenafil (REVATIO) 20 MG tablet 2 to 5 tablets p.o. 30 minutes before sexual activity 30 tablet 11    Symbicort 80-4.5 MCG/ACT inhaler INHALE 2 PUFFS BY MOUTH TWICE DAILY 10.2 g 5     "mupirocin (BACTROBAN) 2 % ointment Apply 1 application  topically to the appropriate area as directed 3 (Three) Times a Day. (Patient not taking: Reported on 12/4/2023) 30 g 0     No facility-administered medications prior to visit.       No opioid medication identified on active medication list. I have reviewed chart for other potential  high risk medication/s and harmful drug interactions in the elderly.        Aspirin is not on active medication list.  Aspirin use is not indicated based on review of current medical condition/s. Risk of harm outweighs potential benefits.  .    Patient Active Problem List   Diagnosis    Cervical stenosis of spinal canal    Chronic gouty arthritis    Erectile dysfunction    Essential hypertension    Hearing problem    High risk medication use    Inflamed seborrheic keratosis    Mild intermittent asthma    Mixed hyperlipidemia    Neck pain    Osteoarthritis of knee    Wheezing    Aphthous ulcer    Impetigo    Skin lesion of face    Plantar fasciitis of right foot    Class 1 obesity with serious comorbidity and body mass index (BMI) of 31.0 to 31.9 in adult    Pain in both feet    Wellness examination     Advance Care Planning   Advance Care Planning     Advance Directive is on file.  ACP discussion was held with the patient during this visit. Patient has an advance directive in EMR which is still valid.      Objective    Vitals:    12/06/23 1127 12/06/23 1159   BP:  151/82   BP Location:  Right arm   Patient Position:  Sitting   Cuff Size:  Adult   Pulse:  59   Weight: 99.8 kg (220 lb)    Height: 180.3 cm (71\")      Estimated body mass index is 30.68 kg/m² as calculated from the following:    Height as of this encounter: 180.3 cm (71\").    Weight as of this encounter: 99.8 kg (220 lb).           Does the patient have evidence of cognitive impairment? No    Lab Results   Component Value Date    CHLPL 215 (H) 11/08/2023    TRIG 72 11/08/2023    HDL 61 11/08/2023     (H) " 2023    VLDL 13 2023    HGBA1C 5.6 2023        HEALTH RISK ASSESSMENT    Smoking Status:  Social History     Tobacco Use   Smoking Status Former    Packs/day: 0.50    Years: 15.00    Additional pack years: 0.00    Total pack years: 7.50    Types: Cigarettes    Start date: 1970    Quit date: 1986    Years since quittin.0    Passive exposure: Never   Smokeless Tobacco Never     Alcohol Consumption:  Social History     Substance and Sexual Activity   Alcohol Use Yes    Alcohol/week: 21.0 standard drinks of alcohol    Types: 14 Cans of beer, 7 Shots of liquor per week     Fall Risk Screen:    JOAQUINADI Fall Risk Assessment has not been completed.    Depression Screenin/21/2023     3:18 PM   PHQ-2/PHQ-9 Depression Screening   Little Interest or Pleasure in Doing Things 0-->not at all   Feeling Down, Depressed or Hopeless 0-->not at all   PHQ-9: Brief Depression Severity Measure Score 0       Health Habits and Functional and Cognitive Screenin/6/2023    12:00 PM   Functional & Cognitive Status   Do you have difficulty preparing food and eating? No   Do you have difficulty bathing yourself, getting dressed or grooming yourself? No   Do you have difficulty using the toilet? No   Do you have difficulty moving around from place to place? No   Do you have trouble with steps or getting out of a bed or a chair? No   Current Diet Well Balanced Diet   Dental Exam Up to date   Eye Exam Up to date        Eye Exam Comment May 2023   Exercise (times per week) 6 times per week        Exercise Frequency Comment less recently with plantar fasciitis   Current Exercises Include Walking   Do you need help using the phone?  No   Are you deaf or do you have serious difficulty hearing?  No   Do you need help to go to places out of walking distance? No   Do you need help shopping? No   Do you need help preparing meals?  No   Do you need help with housework?  No   Do you need help with laundry? No    Do you need help taking your medications? No   Do you need help managing money? No   Do you ever drive or ride in a car without wearing a seat belt? No   Have you felt unusual stress, anger or loneliness in the last month? No   Who do you live with? Spouse   If you need help, do you have trouble finding someone available to you? No   Do you have difficulty concentrating, remembering or making decisions? No       Age-appropriate Screening Schedule:  Refer to the list below for future screening recommendations based on patient's age, sex and/or medical conditions. Orders for these recommended tests are listed in the plan section. The patient has been provided with a written plan.    Health Maintenance   Topic Date Due    ZOSTER VACCINE (1 of 2) Never done    AAA SCREEN (ONE-TIME)  Never done    ANNUAL WELLNESS VISIT  08/19/2022    LIPID PANEL  11/08/2024    BMI FOLLOWUP  11/08/2024    COLORECTAL CANCER SCREENING  05/12/2031    TDAP/TD VACCINES (2 - Td or Tdap) 11/08/2033    HEPATITIS C SCREENING  Completed    COVID-19 Vaccine  Completed    INFLUENZA VACCINE  Completed    Pneumococcal Vaccine 65+  Completed          CMS Preventative Services Quick Reference  Risk Factors Identified During Encounter  Immunizations Discussed/Encouraged: Shingrix and RSV (Respiratory Syncytial Virus)  The above risks/problems have been discussed with the patient.  Pertinent information has been shared with the patient in the After Visit Summary.  An After Visit Summary and PPPS were made available to the patient.    Follow Up:   Next Medicare Wellness visit to be scheduled in 1 year.       Additional E&M Note during same encounter follows:  Patient has multiple medical problems which are significant and separately identifiable that require additional work above and beyond the Medicare Wellness Visit.      Chief Complaint  Medicare Wellness-subsequent    Subjective        HPI  Balbir Castro is also being seen today for eval of  "hypertension, which is chronic per patient history.  He states that he has been well-controlled until recently.  He was seen earlier this week for elevated levels, and he figured out that some of the problem was an error in his use of his blood pressure cuff.  He states that since then he has been monitoring it at home and it has been 130-150s systolic and 80 diastolic.  He states that his heart rate has been running in the 50s and 60s.    Review of Systems   Constitutional:  Negative for diaphoresis.   Respiratory:  Negative for chest tightness and shortness of breath.    Cardiovascular:  Negative for chest pain, palpitations and leg swelling.   Neurological:  Negative for dizziness, syncope and weakness.       Objective   Vital Signs:  /82 (BP Location: Right arm, Patient Position: Sitting, Cuff Size: Adult)   Pulse 59   Ht 180.3 cm (71\")   Wt 99.8 kg (220 lb)   BMI 30.68 kg/m²     Physical Exam  Vitals and nursing note reviewed.   Constitutional:       Appearance: Normal appearance.   HENT:      Head: Normocephalic.   Pulmonary:      Effort: Pulmonary effort is normal.   Neurological:      Mental Status: He is alert and oriented to person, place, and time.   Psychiatric:         Mood and Affect: Mood normal.         Behavior: Behavior normal.             Assessment and Plan   Diagnoses and all orders for this visit:    1. Wellness examination (Primary)    2. Essential hypertension  Assessment & Plan:  Hypertension is chronic and not currently controlled.  He will continue to monitor, and we will make adjustments to his medications if needed.      3. History of tobacco abuse  -     US AAA Screen Limited; Future           Follow Up   Return in about 4 weeks (around 1/3/2024).  Patient was given instructions and counseling regarding his condition or for health maintenance advice. Please see specific information pulled into the AVS if appropriate.           "

## 2024-01-01 NOTE — ASSESSMENT & PLAN NOTE
Hypertension is chronic and not currently controlled.  He will continue to monitor, and we will make adjustments to his medications if needed.

## 2024-11-08 ENCOUNTER — TRANSCRIBE ORDERS (OUTPATIENT)
Dept: GENERAL RADIOLOGY | Facility: HOSPITAL | Age: 74
End: 2024-11-08
Payer: MEDICARE

## 2024-11-08 ENCOUNTER — HOSPITAL ENCOUNTER (OUTPATIENT)
Dept: GENERAL RADIOLOGY | Facility: HOSPITAL | Age: 74
Discharge: HOME | End: 2024-11-08
Payer: MEDICARE

## 2024-11-08 DIAGNOSIS — M76.61 TENDONITIS, ACHILLES, RIGHT: Primary | ICD-10-CM

## 2024-11-08 DIAGNOSIS — M76.61 TENDONITIS, ACHILLES, RIGHT: ICD-10-CM

## 2024-11-08 PROCEDURE — 73610 X-RAY EXAM OF ANKLE: CPT
